# Patient Record
Sex: FEMALE | Race: WHITE | NOT HISPANIC OR LATINO | URBAN - METROPOLITAN AREA
[De-identification: names, ages, dates, MRNs, and addresses within clinical notes are randomized per-mention and may not be internally consistent; named-entity substitution may affect disease eponyms.]

---

## 2017-01-04 ENCOUNTER — HOSPITAL ENCOUNTER (OUTPATIENT)
Dept: OBGYN | Facility: HOSPITAL | Age: 27
Discharge: HOME | End: 2017-01-05
Attending: EMERGENCY MEDICINE | Admitting: FAMILY MEDICINE
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
## 2017-01-04 DIAGNOSIS — M54.9 BACK PAIN: ICD-10-CM

## 2017-01-04 ASSESSMENT — MIFFLIN-ST. JEOR: SCORE: 1436.64

## 2021-05-30 VITALS — BODY MASS INDEX: 26.66 KG/M2 | WEIGHT: 160 LBS | HEIGHT: 65 IN

## 2021-06-08 NOTE — PROGRESS NOTES
2115: Pt arrived via cart from ER. Pt here after being evaluated in ER S/P fall onto her back. EFM/TOCO applied for NST. Pt states she is 33 weeks pregnant and EDC is 3/4/15. With EDC of 3/4/16- pt wheels out to 31 5/7 weeks.   2200: Category 1 Reactive NST obtained and monitors off per MD order. Pt to have bedside US and KB drawn. Will await results of testing and probable DC home.

## 2021-06-08 NOTE — ED PROVIDER NOTES
At this point I agree with the current assessment done in the Emergency Department.   I, Tereza Molina DO have reviewed the documentation, personally taken the patient's history, performed an exam and agree with the physical finds, diagnosis and management plan.     Patient is seen with Harley Menjivar PA-C. Karen Teresa is a 33 weeks pregnant 26 y.o.female, with hx of back surgery with dane placement, who presents to the Emergency Department for evaluation after a fall. Pt reports she tripped over a rug earlier tonight and fell landing with her lower back against the ground. Pt has since had left lower back pain that is exacerbated with extension of her back. She reports she has had some mild radiation of the pain up her spine but denies any radiation of pain down her legs. Pt denies hitting her head or LOC. She denies any abdominal trauma or pain. She denies vaginal bleeding or discharge. Pt denies bowel or bladder incontinence.     The creation of this record is based on the scribe's observations of the work of Tereza Molina DO and the provider's statements to them.  It was created on their behalf by Bin Mortensen, a trained medical scribe. This document has been checked and approved by the attending provider.      ROS: 10 point review of systems obtained and otherwise negative except noted in HPI.    Social:   Never smoker  No illicit drug use    Physical Exam:   General presentation: Alert, Vital signs reviewed. NAD. Sleeping quietly in room.   Musculoskeletal: No extremity tenderness. Full range of motion in all extremities. No extremity edema. Moderate tenderness to palpation over BL paraspinal lumbar musculature, left greater than right.      MDM  At the time of my examination the patient was noted to be sleeping soundly in the room in no apparent distress or discomfort.  On exam she had mild to moderate tenderness palpation over the bilateral lumbar paraspinal musculature, left greater than right.   The risks and benefits of obtaining a lumbar x-ray were discussed with the patient.  Patient declined any imaging at this time since her pain was fairly well controlled.  Although a fracture or disruption of the orthopedic hardware could not be completely ruled out without any imaging studies, it is more likely that her symptoms are due to a strain or contusion of the paraspinal musculature.  Patient was educated and reassured.  She denied any trauma to her abdomen, loss of consciousness, vaginal bleeding or discharge.  Patient was transferred to the L and D floor for further observation since she has 33 weeks pregnant.        I, Tereza Molina DO personally performed the services described in this document as scribed by Bin Mortensen, in my presence, and it is both accurate and complete.      Tereza Molina DO  01/05/17 0017

## 2021-06-08 NOTE — PROGRESS NOTES
01/04/17 2347   Critical Test Results/Notification   Critical Lab Result (Name and Value) Angelina-Omari- Negative  (Jono from Lab)   MD/LIP Notified No new orders   Time MD Notified 2350   Name of Physician JUAN Coleman   Time MD responded 2354   Time Calculation (min) 4 min   Dr. Coleman on phone.  Updated re ultrasound results and negative KB.  Orders received to discharge patient to home.

## 2021-06-08 NOTE — PROGRESS NOTES
Pharmacy Note - Admission Medication History    Pertinent Provider Information: None     ______________________________________________________________________    Prior To Admission (PTA) med list completed and updated in EMR.       No outpatient prescriptions have been marked as taking for the 1/4/17 encounter (Hospital Encounter).       Information source(s): Patient    Summary of Changes to PTA Med List  New: None  Discontinued: None  Changed: None    Patient was asked about OTC/herbal products specifically.  PTA med list reflects this.    Based on the pharmacist s assessment, the PTA med list information appears reliable    Patient appears compliant: Yes    Allergies were reviewed, assessed, and updated with the patient.      Thank you for the opportunity to participate in the care of this patient.    Ashlee Peoples, PharmD  1/4/2017 8:56 PM

## 2021-06-08 NOTE — ED PROVIDER NOTES
"  CHIEF COMPLAINT     Chief Complaint   Patient presents with     Fall     fell down staris about 1900. Fell onto lower back. No abdominal trauma.          HPI     Karen Teresa is a 26 y.o. female (currently 33 weeks) with pertinent history dane placement on spine who presents to the Emergency Department for evaluation of fall.  The patient reports that shortly before arrival she slipped on an area rug and fell backward landing on her lower back and tailbone on the stairs at the edge of the rug.  She noted the immediate onset of pain in the lower back, left greater than right.  Pain is constant but increases with movement. Pain radiates around the back but does not seem to radiate to legs.  She denies head strike but states she was \"knocked out\" and had her \"wind knocked out.\" She confirms she was able to stand after the fall and was able to walk with considerable pain. She denies numbness, weakness, neck pain, SOB, vaginal bleeding, or bowel/bladder incontinence.  The patient had her last OBGYN visit about four months ago.  She does not know what material her dane was made from and denies other health problems.       The creation of this record is based on the scribe s observations of the work being performed by Harley Menjivar PA-C and the provider s statements to them. It was created on his behalf by Madan Aguayo, a trained medical scribe. This document has been checked and approved by the attending provider.         PAST MEDICAL HISTORY SURGICAL HISTORY     Past Medical History   Diagnosis Date     Pregnant      Past Surgical History   Procedure Laterality Date     Back surgery            CURRENT MEDICATIONS ALLERGIES       Current Facility-Administered Medications:      lactated ringers, 0-500 mL/hr, Intravenous, Continuous, Cristiano Coleman MD     naloxone injection 0.04-0.1 mg (NARCAN), 0.04-0.1 mg, Intravenous, PRN **OR** naloxone injection 0.1-0.4 mg (NARCAN), 0.1-0.4 mg, Intramuscular, PRN, " "Harley Menjivar PA-C Allergies   Allergen Reactions     Latex Swelling     Penicillins Unknown          FAMILY HISTORY   No family history on file.      SOCIAL HISTORY     Social History     Social History     Marital status:      Spouse name: N/A     Number of children: N/A     Years of education: N/A     Social History Main Topics     Smoking status: Never Smoker     Smokeless tobacco: None     Alcohol use None     Drug use: No     Sexual activity: Not Asked     Other Topics Concern     None     Social History Narrative     None         REVIEW OF SYSTEMS       10 point review of systems obtained and otherwise negative except noted in HPI.    PHYSICAL EXAM     VITAL SIGNS:   Visit Vitals     /72     Pulse 91     Temp 98.3  F (36.8  C) (Oral)     Resp 18     Ht 5' 5\" (1.651 m)     Wt 160 lb (72.6 kg)     LMP Comment: pregnant     SpO2 98%     BMI 26.63 kg/m2      Constitutional:  Awake, no acute distress   HENT:  Atraumatic, membranes moist  Eyes: EOM intact, no injection  Neck: Supple  Respiratory:  Clear to auscultation bilaterally, no wheezes or crackles   Cardiovascular:  Regular rate and rhythm, single S1 and S2   GI:  Soft, nontender, nondistended, no rebound or guarding   Musculoskeletal:  Moves all extremities, no lower extremity edema, no deformities, tenderness and pain noted to lumbar spine L lateral of spine    Skin:  Warm, dry  Neurologic:  Alert, no focal deficits noted      RADIOLOGY   No results found.    EKG            LABS   No results found for this visit on 01/04/17.      ED COURSE AND MEDICAL DECISION MAKING     Pertinent Labs & Imaging studies reviewed. (See chart for details)    Karen Teresa is a 26 y.o. female (currently 33 weeks) with pertinent history dane placement on spine who presents to the Emergency Department for evaluation of fall.  Initial vitals reviewed. Exam notable for Moves all extremities, no lower extremity edema, no deformities, tenderness and pain " noted to lumbar spine L lateral of spine  .    Differential includes but is not limited to Acute back pain, lumbago, sciatica, lumbar radiculopathy, cauda equina syndrome, mechanical injury or displacement of surgical hardware    8:20 PM Extensive discussion with patient regarding her reassuring presentation and symptoms. She denies symptoms most concerning for cord injury and unlikely to have sustained significant injury resulting in displacement of her lumbar hardware. She was advised we can evaluate the area of concern with imaging. She is unsure what material the spinal rods consist of and if she is able to undergo MR imaging, she is unsure of what location to contact regarding this. She was advised CT imaging would undergo significant artifact due to the rods and unlikely to assist with evaluation as well as radiation to the fetus. She declines plain film imaging due to the radiation exposure to her child. She was educated on symptomatic management and transfer to L/D for fetal monitoring  FINAL IMPRESSION     1. Fall at home, initial encounter    2. Back pain        There are no discharge medications for this patient.        This document is an accurate record of my words and actions during this visit as documented by the scribe.      Harley Menjivar PA-C  01/04/17 8675